# Patient Record
Sex: FEMALE | Race: WHITE | NOT HISPANIC OR LATINO | Employment: FULL TIME | URBAN - METROPOLITAN AREA
[De-identification: names, ages, dates, MRNs, and addresses within clinical notes are randomized per-mention and may not be internally consistent; named-entity substitution may affect disease eponyms.]

---

## 2017-01-16 ENCOUNTER — COMMUNICATION - HEALTHEAST (OUTPATIENT)
Dept: FAMILY MEDICINE | Facility: CLINIC | Age: 27
End: 2017-01-16

## 2017-01-16 DIAGNOSIS — F32.A DEPRESSION: ICD-10-CM

## 2017-03-12 ENCOUNTER — COMMUNICATION - HEALTHEAST (OUTPATIENT)
Dept: FAMILY MEDICINE | Facility: CLINIC | Age: 27
End: 2017-03-12

## 2017-03-12 DIAGNOSIS — H10.13 ALLERGIC CONJUNCTIVITIS, BILATERAL: ICD-10-CM

## 2017-03-12 DIAGNOSIS — F32.A DEPRESSION: ICD-10-CM

## 2017-04-17 ENCOUNTER — OFFICE VISIT - HEALTHEAST (OUTPATIENT)
Dept: FAMILY MEDICINE | Facility: CLINIC | Age: 27
End: 2017-04-17

## 2017-04-17 DIAGNOSIS — F32.A DEPRESSION: ICD-10-CM

## 2017-04-17 DIAGNOSIS — A60.00 GENITAL HERPES: ICD-10-CM

## 2017-04-17 DIAGNOSIS — J06.9 UPPER RESPIRATORY INFECTION: ICD-10-CM

## 2017-04-17 DIAGNOSIS — B00.9 HERPES INFECTION: ICD-10-CM

## 2017-04-17 DIAGNOSIS — H10.13 ALLERGIC CONJUNCTIVITIS, BILATERAL: ICD-10-CM

## 2017-04-17 ASSESSMENT — MIFFLIN-ST. JEOR: SCORE: 1466.02

## 2017-05-02 ENCOUNTER — OFFICE VISIT - HEALTHEAST (OUTPATIENT)
Dept: FAMILY MEDICINE | Facility: CLINIC | Age: 27
End: 2017-05-02

## 2017-05-02 DIAGNOSIS — F41.9 ANXIETY: ICD-10-CM

## 2017-05-02 DIAGNOSIS — F32.A DEPRESSION, UNSPECIFIED DEPRESSION TYPE: ICD-10-CM

## 2017-05-02 DIAGNOSIS — Z30.41 ENCOUNTER FOR SURVEILLANCE OF CONTRACEPTIVE PILLS: ICD-10-CM

## 2017-05-02 DIAGNOSIS — Z00.00 HEALTH CARE MAINTENANCE: ICD-10-CM

## 2017-05-02 DIAGNOSIS — B00.9 HERPES INFECTION: ICD-10-CM

## 2017-05-02 ASSESSMENT — MIFFLIN-ST. JEOR: SCORE: 1456.38

## 2017-05-03 ENCOUNTER — COMMUNICATION - HEALTHEAST (OUTPATIENT)
Dept: FAMILY MEDICINE | Facility: CLINIC | Age: 27
End: 2017-05-03

## 2017-05-04 ENCOUNTER — COMMUNICATION - HEALTHEAST (OUTPATIENT)
Dept: FAMILY MEDICINE | Facility: CLINIC | Age: 27
End: 2017-05-04

## 2017-05-04 ENCOUNTER — AMBULATORY - HEALTHEAST (OUTPATIENT)
Dept: FAMILY MEDICINE | Facility: CLINIC | Age: 27
End: 2017-05-04

## 2017-05-05 ENCOUNTER — COMMUNICATION - HEALTHEAST (OUTPATIENT)
Dept: FAMILY MEDICINE | Facility: CLINIC | Age: 27
End: 2017-05-05

## 2017-05-05 LAB
HSV1 IGG SERPL QL IA: POSITIVE
HSV2 IGG SERPL QL IA: NEGATIVE

## 2017-05-08 LAB
BKR LAB AP ABNORMAL BLEEDING: NO
BKR LAB AP BIRTH CONTROL/HORMONES: NORMAL
BKR LAB AP CERVICAL APPEARANCE: NORMAL
BKR LAB AP GYN ADEQUACY: NORMAL
BKR LAB AP GYN INTERPRETATION: NORMAL
BKR LAB AP GYN OTHER FINDINGS: NORMAL
BKR LAB AP HPV REFLEX: NORMAL
BKR LAB AP LMP: NORMAL
BKR LAB AP PATIENT STATUS: NORMAL
BKR LAB AP PREVIOUS ABNORMAL: NORMAL
BKR LAB AP PREVIOUS NORMAL: NORMAL
HIGH RISK?: NO
HPV INTERPRETATION - HISTORICAL: NORMAL
HPV INTERPRETER - HISTORICAL: NORMAL
PATH REPORT.COMMENTS IMP SPEC: NORMAL
RESULT FLAG (HE HISTORICAL CONVERSION): NORMAL

## 2017-05-09 ENCOUNTER — AMBULATORY - HEALTHEAST (OUTPATIENT)
Dept: FAMILY MEDICINE | Facility: CLINIC | Age: 27
End: 2017-05-09

## 2017-05-09 DIAGNOSIS — B96.89 BV (BACTERIAL VAGINOSIS): ICD-10-CM

## 2017-05-09 DIAGNOSIS — N76.0 BV (BACTERIAL VAGINOSIS): ICD-10-CM

## 2017-05-10 ENCOUNTER — AMBULATORY - HEALTHEAST (OUTPATIENT)
Dept: FAMILY MEDICINE | Facility: CLINIC | Age: 27
End: 2017-05-10

## 2017-05-10 ENCOUNTER — AMBULATORY - HEALTHEAST (OUTPATIENT)
Dept: LAB | Facility: CLINIC | Age: 27
End: 2017-05-10

## 2017-05-10 ENCOUNTER — COMMUNICATION - HEALTHEAST (OUTPATIENT)
Dept: FAMILY MEDICINE | Facility: CLINIC | Age: 27
End: 2017-05-10

## 2017-05-10 DIAGNOSIS — R89.4 POSITIVE TEST FOR HERPES SIMPLEX VIRUS (HSV) ANTIBODY: ICD-10-CM

## 2017-05-12 LAB
HSV1 IGG SERPL QL IA: POSITIVE
HSV2 IGG SERPL QL IA: ABNORMAL

## 2017-06-16 ENCOUNTER — COMMUNICATION - HEALTHEAST (OUTPATIENT)
Dept: FAMILY MEDICINE | Facility: CLINIC | Age: 27
End: 2017-06-16

## 2017-06-16 DIAGNOSIS — R89.4 POSITIVE TEST FOR HERPES SIMPLEX VIRUS (HSV) ANTIBODY: ICD-10-CM

## 2017-06-28 ENCOUNTER — AMBULATORY - HEALTHEAST (OUTPATIENT)
Dept: LAB | Facility: CLINIC | Age: 27
End: 2017-06-28

## 2017-06-28 DIAGNOSIS — R89.4 POSITIVE TEST FOR HERPES SIMPLEX VIRUS (HSV) ANTIBODY: ICD-10-CM

## 2017-06-30 LAB
HSV1 IGG SERPL QL IA: POSITIVE
HSV2 IGG SERPL QL IA: ABNORMAL

## 2017-07-02 LAB
MISCELLANEOUS TEST DEPT. - HE HISTORICAL: NORMAL
PERFORMING LAB: NORMAL
SPECIMEN STATUS: NORMAL
TEST NAME: NORMAL

## 2017-10-22 ENCOUNTER — COMMUNICATION - HEALTHEAST (OUTPATIENT)
Dept: FAMILY MEDICINE | Facility: CLINIC | Age: 27
End: 2017-10-22

## 2017-10-22 DIAGNOSIS — F32.A DEPRESSION: ICD-10-CM

## 2017-12-21 ENCOUNTER — COMMUNICATION - HEALTHEAST (OUTPATIENT)
Dept: FAMILY MEDICINE | Facility: CLINIC | Age: 27
End: 2017-12-21

## 2017-12-21 DIAGNOSIS — Z30.41 ENCOUNTER FOR SURVEILLANCE OF CONTRACEPTIVE PILLS: ICD-10-CM

## 2017-12-29 ENCOUNTER — COMMUNICATION - HEALTHEAST (OUTPATIENT)
Dept: FAMILY MEDICINE | Facility: CLINIC | Age: 27
End: 2017-12-29

## 2018-03-24 ENCOUNTER — COMMUNICATION - HEALTHEAST (OUTPATIENT)
Dept: FAMILY MEDICINE | Facility: CLINIC | Age: 28
End: 2018-03-24

## 2018-03-24 DIAGNOSIS — H10.13 ALLERGIC CONJUNCTIVITIS, BILATERAL: ICD-10-CM

## 2018-03-26 ENCOUNTER — COMMUNICATION - HEALTHEAST (OUTPATIENT)
Dept: FAMILY MEDICINE | Facility: CLINIC | Age: 28
End: 2018-03-26

## 2018-03-26 DIAGNOSIS — F32.A DEPRESSION: ICD-10-CM

## 2018-05-14 ENCOUNTER — COMMUNICATION - HEALTHEAST (OUTPATIENT)
Dept: FAMILY MEDICINE | Facility: CLINIC | Age: 28
End: 2018-05-14

## 2018-05-14 DIAGNOSIS — F32.A DEPRESSION: ICD-10-CM

## 2018-06-12 ENCOUNTER — COMMUNICATION - HEALTHEAST (OUTPATIENT)
Dept: FAMILY MEDICINE | Facility: CLINIC | Age: 28
End: 2018-06-12

## 2018-06-12 DIAGNOSIS — Z30.41 ENCOUNTER FOR SURVEILLANCE OF CONTRACEPTIVE PILLS: ICD-10-CM

## 2021-05-30 VITALS — WEIGHT: 163.25 LBS | BODY MASS INDEX: 27.2 KG/M2 | HEIGHT: 65 IN

## 2021-05-30 VITALS — WEIGHT: 165.38 LBS | HEIGHT: 65 IN | BODY MASS INDEX: 27.56 KG/M2

## 2021-06-02 ENCOUNTER — RECORDS - HEALTHEAST (OUTPATIENT)
Dept: ADMINISTRATIVE | Facility: CLINIC | Age: 31
End: 2021-06-02
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
## 2021-06-15 PROBLEM — B00.9 HERPES INFECTION: Status: ACTIVE | Noted: 2017-04-17

## 2022-10-19 ENCOUNTER — HOSPITAL ENCOUNTER (OUTPATIENT)
Facility: CLINIC | Age: 32
Setting detail: OBSERVATION
Discharge: HOME | End: 2022-10-21
Attending: EMERGENCY MEDICINE | Admitting: EMERGENCY MEDICINE
Payer: COMMERCIAL

## 2022-10-19 DIAGNOSIS — F33.2 SEVERE EPISODE OF RECURRENT MAJOR DEPRESSIVE DISORDER, WITHOUT PSYCHOTIC FEATURES (H): ICD-10-CM

## 2022-10-19 DIAGNOSIS — F43.10 PTSD (POST-TRAUMATIC STRESS DISORDER): ICD-10-CM

## 2022-10-19 DIAGNOSIS — R45.851 SUICIDAL IDEATION: ICD-10-CM

## 2022-10-19 LAB — SARS-COV-2 RNA RESP QL NAA+PROBE: NEGATIVE

## 2022-10-19 PROCEDURE — 99285 EMERGENCY DEPT VISIT HI MDM: CPT | Mod: 25

## 2022-10-19 PROCEDURE — G0378 HOSPITAL OBSERVATION PER HR: HCPCS

## 2022-10-19 PROCEDURE — 90791 PSYCH DIAGNOSTIC EVALUATION: CPT

## 2022-10-19 PROCEDURE — U0005 INFEC AGEN DETEC AMPLI PROBE: HCPCS | Performed by: EMERGENCY MEDICINE

## 2022-10-19 PROCEDURE — 99220 PR INITIAL OBSERVATION CARE,LEVEL III: CPT | Performed by: PSYCHIATRY & NEUROLOGY

## 2022-10-19 PROCEDURE — C9803 HOPD COVID-19 SPEC COLLECT: HCPCS

## 2022-10-19 PROCEDURE — 250N000013 HC RX MED GY IP 250 OP 250 PS 637: Performed by: PSYCHIATRY & NEUROLOGY

## 2022-10-19 RX ORDER — IBUPROFEN 600 MG/1
600 TABLET, FILM COATED ORAL EVERY 6 HOURS PRN
Status: DISCONTINUED | OUTPATIENT
Start: 2022-10-19 | End: 2022-10-21 | Stop reason: HOSPADM

## 2022-10-19 RX ORDER — ARIPIPRAZOLE 2 MG/1
2 TABLET ORAL DAILY
Status: DISCONTINUED | OUTPATIENT
Start: 2022-10-19 | End: 2022-10-21 | Stop reason: HOSPADM

## 2022-10-19 RX ORDER — LANOLIN ALCOHOL/MO/W.PET/CERES
3 CREAM (GRAM) TOPICAL
Status: DISCONTINUED | OUTPATIENT
Start: 2022-10-19 | End: 2022-10-21 | Stop reason: HOSPADM

## 2022-10-19 RX ORDER — HYDROXYZINE HYDROCHLORIDE 50 MG/1
50 TABLET, FILM COATED ORAL 3 TIMES DAILY PRN
Status: DISCONTINUED | OUTPATIENT
Start: 2022-10-19 | End: 2022-10-21 | Stop reason: HOSPADM

## 2022-10-19 RX ORDER — QUETIAPINE FUMARATE 25 MG/1
25 TABLET, FILM COATED ORAL AT BEDTIME
Status: DISCONTINUED | OUTPATIENT
Start: 2022-10-19 | End: 2022-10-21 | Stop reason: HOSPADM

## 2022-10-19 RX ORDER — SERTRALINE HYDROCHLORIDE 25 MG/1
75 TABLET, FILM COATED ORAL DAILY
COMMUNITY
End: 2022-10-19

## 2022-10-19 RX ORDER — QUETIAPINE FUMARATE 25 MG/1
12.5 TABLET, FILM COATED ORAL
COMMUNITY

## 2022-10-19 RX ADMIN — ARIPIPRAZOLE 2 MG: 2 TABLET ORAL at 16:38

## 2022-10-19 RX ADMIN — HYDROXYZINE HYDROCHLORIDE 50 MG: 50 TABLET, FILM COATED ORAL at 19:27

## 2022-10-19 RX ADMIN — QUETIAPINE FUMARATE 25 MG: 25 TABLET ORAL at 22:32

## 2022-10-19 RX ADMIN — IBUPROFEN 600 MG: 600 TABLET ORAL at 16:38

## 2022-10-19 ASSESSMENT — ENCOUNTER SYMPTOMS
FEVER: 0
COUGH: 0
APPETITE CHANGE: 1

## 2022-10-19 ASSESSMENT — ACTIVITIES OF DAILY LIVING (ADL)
ADLS_ACUITY_SCORE: 33
ADLS_ACUITY_SCORE: 35

## 2022-10-19 NOTE — ED NOTES
"32 year old female with history of PTSD, depression, and anxiety received from ED due to increased depression and SI. Reports increased sadness as well as weight loss recently. Reports current SI with multiple plans. Pt reports a suicide attempt in 2008 and the most recent attempt about 1 1/2 years ago. Pt reports taking \"sertraline\" but records indicate that this may actually be \"fluoxetine\". Nursing and risk assessments completed. Assessments reviewed with [facility] and physician. Admission information reviewed with patient. Patient given a tour of [facility] and instructions on using the facility. Questions regarding [facility] addressed. Pt safety search completed.    "

## 2022-10-19 NOTE — ED TRIAGE NOTES
"Patient states \"something is wrong with my brain, I dont want to live anymore\". Plan is to start car in garage or poisonious or jumping\". Patient quit job this morning. States has been crying for 3 months.       "

## 2022-10-19 NOTE — PLAN OF CARE
Josephine Brown  October 19, 2022  Plan of Care Hand-off Note     Patient Care Path: Observation    Plan for Care:   Recommendation for patient is to remain in [facility] for safety, monitoring and stabilization. She presents to the ED with significant depression and suicidal ideation with plan, means and intent. Patient has several stressors, abruptly quit her job today and has begun making preparations for her family following her death (including cancelling memberships/bills, writing a letter). Patient does acknowledge that she wants help for her symptoms and this is why she voluntarily presented to the hospital; writer discusses plan for her to remain on [facility] at this time for medication management with psychiatry and ongoing therapeutic check-ins with [facility].     Patient will need to be closely monitored and reassessed daily for symptoms, safety and disposition planning. If symptoms persist with minimal relief [facility], inpatient admission should be strongly considered. However, if patient is able to engage in a well established safety plan (with likely family involvement and limiting access to lethal means) as well as increased outpatient support, discharge could be considered.     Patient is agreeable at this time to remaining in observation.     Critical Safety Issues: suicidal ideation with plan (several including crashing her car, jumping off a bridge/building, carbon monoxide poisoning or overdosing), means and intent. She has been engaging in preparatory behaviors. Further, she has access to firearms in her home.     Overview:  This patient is a child/adolescent: No    This patient has additional special visitor precautions: No    Legal Status: Voluntary, but holdable due to suicidal ideation with plan, means and intent, as well as need for further observation and stabilization     Contacts:   Ronda, mother: Contact [phone]  Giovani, father: Contact [phone]    Psychiatry  Consult:  Patient has been seen by Dr. Alarcon who agrees with plan for observation.       Updated RN and Attending Provider regarding plan of care.    Tuyet Spring
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
## 2022-10-19 NOTE — ED NOTES
Pt presents as tearful, flat, and cooperative. Pt met with LMHP and will meet with provider to determine plan.

## 2022-10-19 NOTE — ED PROVIDER NOTES
History   Chief Complaint:  Suicidal       HPI   Josephine Brown is a 32 year old female with history of depression, anxiety, PTSD and suicidal ideation who presents with suicidal thoughts. She reports that her mental health has been worsening lately and has been crying more easily in the past 3 months. She has also lost 15 lbs in the past 3 months due to poor appetite. 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 She has had increasing suicidal thoughts in the past couple days. She plans to jump off a bridge, start her car in the garage or find something poisonous. She has history of suicide attempt in the past when she stole arsenic from her college lab. She was also hospitalized once before in 2007 after this. She lives alone. She has supportive family in the area as well as friends. She takes sleeping pill and sertraline which she is compliant with. She has had an increase in her dosage in the spring. She reports of losing her service dog of 10 years this spring. She sees a therapist biweekly virtually which is helpful for her. Denies cough or fever.    Review of Systems   Constitutional: Positive for appetite change. Negative for fever.   Respiratory: Negative for cough.    Psychiatric/Behavioral: Positive for suicidal ideas.   All other systems reviewed and are negative.      Allergies:  Beer  Doxycycline    Medications:  Sertraline  Seroquel    Past Medical History:     Anxiety  PTSD  Depression  Intestinal parasitism  Herpes infection  Suicidal ideation    Past Surgical History:    Appendectomy  Bunionectomy  Cholecystectomy  Tonsillectomy  Tympanostomy tube placement, bilateral  Farber teeth extraction    Family History:    Mother - type I diabetes, hypothyroidism  Brother - asthma    Social History:  The patient presents to the ED alone.  She lives alone.  PCP: Pippa Santiago       Physical Exam     Patient Vitals for the past 24 hrs:   BP Temp Temp src Pulse  "Resp SpO2 Height Weight   10/19/22 0945 136/76 96.8  F (36  C) Temporal 69 16 97 % 1.651 m (5' 5\") 73 kg (161 lb)     Physical Exam  SKIN:  Warm, dry.  HEMATOLOGIC/IMMUNOLOGIC/LYMPHATIC:  No pallor.  EYES:  Conjunctivae normal.  CARDIOVASCULAR:  Regular rate and rhythm.  RESPIRATORY:  No respiratory distress, breath sounds equal and normal.  GASTROINTESTINAL:  Soft, nontender abdomen.  MUSCULOSKELETAL: Normal body habitus.  NEUROLOGIC:  Alert, conversant.  PSYCHIATRIC: Depressed mood with flat affect.  Calm and cooperative.  Does not appear to be responding to internal stimuli.  No psychotic features.    Emergency Department Course     Laboratory:  Labs Ordered and Resulted from Time of ED Arrival to Time of ED Departure   COVID-19 VIRUS (CORONAVIRUS) BY PCR - Normal       Result Value    SARS CoV2 PCR Negative          Emergency Department Course:  Mental Health Risk Assessment      PSS-3    Date and Time Over the past 2 weeks have you felt down, depressed, or hopeless? Over the past 2 weeks have you had thoughts of killing yourself? Have you ever attempted to kill yourself? When did this last happen? User   10/19/22 0946 yes yes yes more than 6 months ago KML      C-SSRS ([place])    Date and Time Q1 Wished to be Dead (Past Month) Q2 Suicidal Thoughts (Past Month) Q3 Suicidal Thought Method Q4 Suicidal Intent without Specific Plan Q5 Suicide Intent with Specific Plan Q6 Suicide Behavior (Lifetime) Within the Past 3 Months? RETIRED: Level of Risk per Screen Screening Not Complete User   10/19/22 0946 yes yes yes yes yes yes -- -- -- KML              Suicide assessment completed by mental health (D.EPadmajaC., LCSW, etc.)    Reviewed:  I reviewed nursing notes, vitals, past medical history and Care Everywhere    Assessments:  1002 I obtained history and examined the patient as noted above.     Disposition:  The patient was transferred to [facility].     Impression & Plan     Medical Decision Making:  This patient presents " with declining mental health and admits to feeling suicidal with several considered suicidal plans.  [facility]  COVID test proved negative so she was transferred to [facility] for further care.  I did not think she required other testing as far as medical clearance.    Diagnosis:    ICD-10-CM    1. Suicidal ideation  R45.851           Discharge Medications:  New Prescriptions    No medications on file       Scribe Disclosure:  I, Melissa Mota, am serving as a scribe at 9:51 AM on 10/19/2022 to document services personally performed by Kel Echols MD based on my observations and the provider's statements to me.              Kel Echols MD  10/19/22 3541

## 2022-10-19 NOTE — ED PROVIDER NOTES
EmPATH Unit - Initial Psychiatric Observation Note  [facility] Emergency Department  Observation Initiation Date: Oct 19, 2022    Josephine Brown MRN: [id number]   Age: 32 year old YOB: 1990     History     Chief Complaint   Patient presents with     Suicidal     HPI  Josephine Brown is a 32 year old female with a past history notable for major depressive disorder and PTSD who presented to the emergency department voluntarily reporting worsening depressed mood and suicidal ideation.  She had also reported resigning from her job shortly prior to coming to the emergency department.  She had been contemplating various means of pursuing suicide while experiencing severe depressive symptoms and a sense of hopelessness.  In the emergency department, she was determined to be medically stable and transferred to the EmPATH unit for psychiatric assessment.  On examination, the patient recalls maintaining adequate control of her depressive symptoms over the past many years through utilizing fluoxetine at varying doses in conjunction with psychotherapy.  She alluded to a history of trauma leading to a diagnosis of PTSD, further complicated by several depressive episodes.  More recently, she was in a meaningful relationship with her significant other, anticipating marriage and a family, until he disclosed to her that he is not attracted to her.  This led to a discord of their relationship and activation of negative thoughts that triggered low self-esteem and low self-worth.  Vegetative symptoms progressively worsened, eventually leading to loss of motivation and inability to function.  This intensified a sense of hopelessness, leading to suicidal ideation.  There was no indication of psychosis or homicidal thoughts.  She presents today as help seeking and wanting to gain treatment interventions to address these symptoms.  She also reports occasional cannabis use.  No other illicit substance usage reported.  She has  "maintained adherence with her medications and has been attending psychotherapy as scheduled.    Past Medical History  Past Medical History:   Diagnosis Date     Mental disorder 2007    PTSD     Past Surgical History:   Procedure Laterality Date     APPENDECTOMY  2009     BUNIONECTOMY Bilateral 2008    7 follow-up surgeries on the right side     CHOLECYSTECTOMY  2014     QUEtiapine (SEROQUEL) 25 MG tablet  Prenatal Vit-Fe Fumarate-FA (PRENATAL MULTIVITAMIN  PLUS IRON) 27-0.8 MG TABS      Allergies   Allergen Reactions     Beer Anaphylaxis     Doxycycline Hives     Family History  Family History   Problem Relation Age of Onset     Diabetes Mother         Type 1     Hypothyroidism Mother      Asthma Brother      Breast Cancer Maternal Aunt 65.00        Breast     Depression Maternal Aunt      Cancer Maternal Uncle         Brain     Diabetes Maternal Grandmother         Type 2     Dementia Paternal Grandfather      Social History   Social History     Tobacco Use     Smoking status: Some Days     Types: Cigarettes     Last attempt to quit: 1/1/2007     Years since quitting: 15.8   Substance Use Topics     Alcohol use: No     Drug use: No      Past medical history, past surgical history, medications, allergies, family history, and social history were reviewed with the patient. No additional pertinent items.       Review of Systems  A complete review of systems was performed with pertinent positives and negatives noted in the HPI, and all other systems negative.    Physical Examination   BP: 136/76  Pulse: 69  Temp: 96.8  F (36  C)  Resp: 16  Height: 165.1 cm (5' 5\")  Weight: 73 kg (161 lb)  SpO2: 97 %    Physical Exam  General: Appears stated age.   Neuro: Alert and fully oriented. Extremities appear to demonstrate normal strength on visual inspection.   Integumentary/Skin: no rash visualized, normal color    Psychiatric Examination   Appearance: awake, alert  Attitude:  cooperative  Eye Contact:  fair  Mood:  anxious and " depressed  Affect:  mood congruent and intensity is blunted  Speech:  clear, coherent  Psychomotor Behavior:  no evidence of tardive dyskinesia, dystonia, or tics  Thought Process:  linear  Associations:  no loose associations  Thought Content:  no evidence of psychotic thought and passive suicidal ideation present  Insight:  fair  Judgement:  fair  Oriented to:  time, person, and place  Attention Span and Concentration:  fair  Recent and Remote Memory:  fair  Language: able to name/identify objects without impairment  Fund of Knowledge: intact with awareness of current and past events    ED Course        Labs Ordered and Resulted from Time of ED Arrival to Time of ED Departure   COVID-19 VIRUS (CORONAVIRUS) BY PCR - Normal       Result Value    SARS CoV2 PCR Negative     TSH WITH FREE T4 REFLEX       Assessments & Plan (with Medical Decision Making)   Patient presenting with relapse of depressive symptoms in the context of several psychosocial stressors and a relationship discord.  Preceding this episode, she has maintained a favorable response to fluoxetine at varying doses depending on the prevalence of her depressive symptoms.  She is also requesting thyroid testing while noting a history of thyroid dysfunction in the family and concern for vegetative symptoms.  Nursing notes reviewed noting no acute issues.     I have reviewed the assessment completed by the [facility].     During the observation period, the patient did not require medications for agitation, and did not require restraints/seclusion for patient and/or provider safety.     The patient was found to have a psychiatric condition that would benefit from an observation stay in the emergency department for further psychiatric stabilization and/or coordination of a safe disposition. The observation plan includes serial assessments of psychiatric condition, potential administration of medications if indicated, further disposition pending the patient's  psychiatric course during the monitoring period.     Preliminary diagnosis:    ICD-10-CM    1. Suicidal ideation  R45.851       2. Severe episode of recurrent major depressive disorder, without psychotic features (H)  F33.2       3. PTSD (post-traumatic stress disorder)  F43.10           Treatment Plan:  -Increase fluoxetine from 60 mg to 80 mg daily to optimize antidepressant treatments while noting that she has gained benefit from this medication in the past.  -Add Abilify 2 mg daily for augmentation of fluoxetine, targeting symptoms of her depressive disorder.  Risks and benefits were reviewed.  -Labs have been ordered including TSH  -Resume individual psychotherapy  -Resume outpatient medication management appointments  -Consider a referral for intensive outpatient programming  -Enter to observation status and reassess tomorrow.    --  Tera Alarcon MD   [facility] EMERGENCY DEPT  EmPATH Unit  10/19/2022        Tera Alarcon MD  10/19/22 [id number]

## 2022-10-20 LAB — TSH SERPL DL<=0.005 MIU/L-ACNC: 0.77 MU/L (ref 0.4–4)

## 2022-10-20 PROCEDURE — 36415 COLL VENOUS BLD VENIPUNCTURE: CPT | Performed by: PSYCHIATRY & NEUROLOGY

## 2022-10-20 PROCEDURE — 84443 ASSAY THYROID STIM HORMONE: CPT | Performed by: PSYCHIATRY & NEUROLOGY

## 2022-10-20 PROCEDURE — 250N000013 HC RX MED GY IP 250 OP 250 PS 637: Performed by: PSYCHIATRY & NEUROLOGY

## 2022-10-20 PROCEDURE — 99214 OFFICE O/P EST MOD 30 MIN: CPT | Performed by: NURSE PRACTITIONER

## 2022-10-20 PROCEDURE — G0378 HOSPITAL OBSERVATION PER HR: HCPCS

## 2022-10-20 RX ORDER — FLUOXETINE 40 MG/1
80 CAPSULE ORAL DAILY
Qty: 60 CAPSULE | Refills: 0 | Status: SHIPPED | OUTPATIENT
Start: 2022-10-20

## 2022-10-20 RX ORDER — ARIPIPRAZOLE 2 MG/1
2 TABLET ORAL DAILY
Qty: 30 TABLET | Refills: 0 | Status: SHIPPED | OUTPATIENT
Start: 2022-10-20

## 2022-10-20 RX ADMIN — ARIPIPRAZOLE 2 MG: 2 TABLET ORAL at 09:01

## 2022-10-20 RX ADMIN — MELATONIN TAB 3 MG 3 MG: 3 TAB at 21:26

## 2022-10-20 RX ADMIN — HYDROXYZINE HYDROCHLORIDE 50 MG: 50 TABLET, FILM COATED ORAL at 22:26

## 2022-10-20 RX ADMIN — QUETIAPINE FUMARATE 25 MG: 25 TABLET ORAL at 21:26

## 2022-10-20 RX ADMIN — FLUOXETINE 80 MG: 20 CAPSULE ORAL at 09:01

## 2022-10-20 ASSESSMENT — ACTIVITIES OF DAILY LIVING (ADL)
ADLS_ACUITY_SCORE: 35

## 2022-10-20 NOTE — ED NOTES
"Pt came to nurses desk and requested sleeping medication. When asked about wanting to go to bed, patient stated \" I don't really want to go to bed but I don't want to be awake\". Pt was offered hydroxyzine to help with anxiety and was provided education on how medication worked and side effects.   "

## 2022-10-20 NOTE — ED NOTES
"[facility] Crisis Reassessment      Pt was first seen on 10/19/2022 by Tuyet Spring; see the initial assessment note for details.      Patient Presentation    Initial ED presentation details: She presents to the ED with significant depression and suicidal ideation with plan, means and intent. Patient has several stressors, abruptly quit her job today and has begun making preparations for her family following her death (including cancelling memberships/bills, writing a letter). Patient does acknowledge that she wants help for her symptoms and this is why she voluntarily presented to the hospital; writer discusses plan for her to remain on emPATH at this time for medication management with psychiatry and ongoing therapeutic check-ins with [facility].     Current patient presentation: Patient was sitting in a recliner coloring on paper when writer approached her for a check in. She presented with a blunted affect but brightens appropriately. She states she feels anxious about not knowing what her plan is. She states she is still feeling suicidal, but less intensely compared to yesterday. She states she has been adjusting to med changes well. She states she has a virtual appointment with her therapist tomorrow at 1pm, that she would like to attend.     Changes observed since initial assessment: Patient states that she feels better than when she arrived to [facility]. She states that if she cries, she is able to stop. She compares this to uncontrollable crying for the past month. On a scale of 0-10, she states that her SI severity is at a \"3 or 4.\" Yesterday, it was at a 9. She states that she is hoping not to go inpatient. She states that she is not interested in PHP/IOP at this time because she feels she needs to work.       Risk of Harm    Repeat ESS-6: Date of Completion 10/20/2022  1.a. Over the past 2 weeks, have you had thoughts of killing yourself? Yes  1.b. Have you ever attempted to kill yourself and, if yes, when did this " last happen? Yes 1.5 years ago   2. Recent or current suicide plan? Yes recent, denies current plan   3. Recent or current intent to act on ideation? Yes recent, denies intent currently  4. Lifetime psychiatric hospitalization? Yes  5. Pattern of excessive substance use? No  6. Current irritability, agitation, or aggression? No  Scoring note: BOTH 1a and 1b must be yes for it to score 1 point, if both are not yes it is zero. All others are 1 point per number. If all questions 1a/1b - 6 are no, risk is negligible. If one of 1a/1b is yes, then risk is mild. If either question 2 or 3, but not both, is yes, then risk is automatically moderate regardless of total score. If both 2 and 3 are yes, risk is automatically high regardless of total score.      Score: 4, moderate risk    Is the patient experiencing current suicidal ideation: Yes. Thoughts to kill self with no plan or intent    Does the patient have thoughts of harming others? No      Does the patient have access to lethal means? Yes - describe: Pt states she has a rifle and shot gun at home that is locked up.      Does the patient engage in non-suicidal self-injurious behavior (NSSI/SIB)? no     Does the patient have thoughts of harming others? No    Mental Status Exam   Affect: Blunted   Appearance: Appropriate    Attention Span/Concentration: Attentive?    Eye Contact: Engaged   Fund of Knowledge: Appropriate    Language /Speech Content: Fluent   Language /Speech Volume: Normal    Language /Speech Rate/Productions: Normal    Recent Memory: Intact   Remote Memory: Intact   Mood: Anxious and Depressed    Orientation to Person: Yes    Orientation to Place: Yes   Orientation to Time of Day: Yes    Orientation to Date: Yes    Situation (Do they understand why they are here?): Yes    Psychomotor Behavior: Normal    Thought Content: Suicidal   Thought Form: Intact         Therapeutic Intervention  The following therapeutic methodologies were employed when working with  the patient: Establishing rapport, Active listening, Assess dimensions of crisis, Apply solution-focused therapy to address current crisis, Identify additional supports and alternative coping skills, Brief Supportive Therapy and Trauma-Informed Care. Patient response to intervention: receptive.      Clinical Substantiation of Recommendations  Patient reports significant improvement of depressive sx, including reduction of SI intensity. Pt still endorses SI, although she denies plan/intent at this time. Pt is in agreement to stay at [facility] for observation for another night. Upon discharge, she would like to discuss more therapy options. She currently has a therapist she sees biweekly. Her next appointment is tomorrow 10/21/22 virtually at 1pm. She would like to keep this appointment.     Plan:    Disposition  Recommended disposition: Observation in EmPATH      Reviewed case and recommendations with attending provider. Attending Name: Writer has not yet consulted with attending provider at this time.       Attending concurs with disposition: N/A    Patient concurs with disposition: Yes      Final disposition: Observation in EmPATH        Assessment Details  Total duration spent on the patient case in minutes: .75 hrs     CPT code(s) utilized: 05973 - Psychotherapy (with patient) - 45 (38-52*) min       FAUSTINA Brooks, [facility]  Callback: [phone]

## 2022-10-20 NOTE — PROGRESS NOTES
Patient slept soundly through the night without waking. Respirations even and unlabored. Changing positions occasionally. Plan for reassessment today.

## 2022-10-20 NOTE — ED NOTES
Patient has been visible on the unit and social with peers. Spent time this evening playing card games with peers and participated appropriately. Patient's mood remains depressed, anxious with sad affect. She was tearful during interactions with staff. Patient continues to endorse suicidal ideation. She expressed frustration with her situation. Compliant with medication. Currently in sensory room D. Nursing will continue to to monitor for safety.

## 2022-10-20 NOTE — PROGRESS NOTES
Pt has been calm, pleasant and cooperative this shift.Pt was med compliant. Pt was visible on the unit this shift. She socialized with staff and peers. She took a shower today. Plan for pt to stay on observation status and be reassessed in the morning. Her discharge meds were placed in her locker.

## 2022-10-20 NOTE — ED PROVIDER NOTES
"[facility] Unit - Psychiatric Consultation  [facility] Emergency Department    Josephine Brown MRN: [id number]   Age: 32 year old YOB: 1990     History     Chief Complaint   Patient presents with     Suicidal     HPI  Josephine Brown is a 32 year old female with a past history notable for major depressive disorder and PTSD who presented to the emergency department voluntarily reporting worsening depressed mood and suicidal ideation.  She had also reported resigning from her job shortly prior to coming to the emergency department.  She had been contemplating various means of pursuing suicide while experiencing severe depressive symptoms and a sense of hopelessness.  In the emergency department, she was determined to be medically stable and transferred to the EmPATH unit for psychiatric assessment.    Patient is seen for reassessment today. She received the increased dose of fluoxetine, as well as the initial dose of aripiprazole. She is thus far tolerating these doses, noting some mild GI discomfort but no nausea/vomiting. She reports having slept better than expected last evening, feels adequately rested today. She reports chronic night sweats, which she had also experienced last night. Her appetite is a bit low, and she states has been low for the past 6-9 months. She is hoping to be able to continue to exercise here at [facility]. She states her mood has improved and attributes this to the change in environment. She notes that today, she no longer feels that she \"needs\" to die, despite not wanting to live. Yesterday, she felt that she wanted and needed to die. She would like to remain in a supportive environment for another night, noting benefit from being here.     Past Medical History  Past Medical History:   Diagnosis Date     Mental disorder 2007    PTSD     Past Surgical History:   Procedure Laterality Date     APPENDECTOMY  2009     BUNIONECTOMY Bilateral 2008    7 follow-up surgeries on the right side " "    CHOLECYSTECTOMY  2014     QUEtiapine (SEROQUEL) 25 MG tablet  Prenatal Vit-Fe Fumarate-FA (PRENATAL MULTIVITAMIN  PLUS IRON) 27-0.8 MG TABS      Allergies   Allergen Reactions     Beer Anaphylaxis     Doxycycline Hives     Family History  Family History   Problem Relation Age of Onset     Diabetes Mother         Type 1     Hypothyroidism Mother      Asthma Brother      Breast Cancer Maternal Aunt 65.00        Breast     Depression Maternal Aunt      Cancer Maternal Uncle         Brain     Diabetes Maternal Grandmother         Type 2     Dementia Paternal Grandfather      Social History   Social History     Tobacco Use     Smoking status: Some Days     Types: Cigarettes     Last attempt to quit: 1/1/2007     Years since quitting: 15.8   Substance Use Topics     Alcohol use: No     Drug use: No      Past medical history, past surgical history, medications, allergies, family history, and social history were reviewed with the patient. No additional pertinent items.       Review of Systems  A complete review of systems was performed with pertinent positives and negatives noted in the HPI, and all other systems negative.    Physical Examination   BP: 136/76  Pulse: 69  Temp: 96.8  F (36  C)  Resp: 16  Height: 165.1 cm (5' 5\")  Weight: 73 kg (161 lb)  SpO2: 97 %    Physical Exam  General: Appears stated age.   Neuro: Alert and fully oriented. Extremities appear to demonstrate normal strength on visual inspection.   Integumentary/Skin: no rash visualized, normal color    Psychiatric Examination   Appearance: awake, alert, adequately groomed, appeared as age stated and casually dressed  Attitude:  cooperative  Eye Contact:  intense  Mood:  depressed  Affect:  mood congruent and intensity is blunted  Speech:  clear, coherent  Psychomotor Behavior:  no evidence of tardive dyskinesia, dystonia, or tics  Thought Process:  linear  Associations:  no loose associations  Thought Content:  no evidence of psychotic thought and " passive suicidal ideation present  Insight:  fair  Judgement:  fair  Oriented to:  time, person, and place  Attention Span and Concentration:  fair  Recent and Remote Memory:  intact  Language: able to name/identify objects without impairment  Fund of Knowledge: intact with awareness of current and past events    ED Course        Labs Ordered and Resulted from Time of ED Arrival to Time of ED Departure   COVID-19 VIRUS (CORONAVIRUS) BY PCR - Normal       Result Value    SARS CoV2 PCR Negative     TSH WITH FREE T4 REFLEX - Normal    TSH 0.77         Assessments & Plan (with Medical Decision Making)   Patient presenting with worsening depressive symptoms in the setting of psychosocial stress and relationship discord. TSH was drawn and results were reviewed with patient. Thus far, tolerating increased dose of fluoxetine and initiation of Abilify. Nursing notes reviewed noting no acute issues.     I have reviewed the assessment completed by the [facility].     Preliminary diagnosis:    ICD-10-CM    1. Suicidal ideation  R45.851       2. Severe episode of recurrent major depressive disorder, without psychotic features (H)  F33.2       3. PTSD (post-traumatic stress disorder)  F43.10            Treatment Plan:  - Continue on observation status while awaiting further improvements in mood and suicidal thinking.   - Continue fluoxetine at the increased dose of 80 mg daily for treatment of depression and PTSD symptoms  - Continue newly initiated Abilify 2 mg daily for mood augmentation  - Continue quetiapine 25 mg at bedtime for sleep  - Reviewed with patient her TSH result - WNL at 0.77  - Patient has outpatient therapy arranged for tomorrow at 1pm. She will likely discharge prior to that appointment pending tomorrow's reassessment.    --  Vince Benz CNP   [facility] EMERGENCY DEPT  EmPATH Unit  10/19/2022      Vince Benz CNP  10/20/22 [id number]

## 2022-10-21 VITALS
RESPIRATION RATE: 16 BRPM | HEART RATE: 75 BPM | SYSTOLIC BLOOD PRESSURE: 120 MMHG | OXYGEN SATURATION: 97 % | WEIGHT: 161.3 LBS | HEIGHT: 65 IN | BODY MASS INDEX: 26.87 KG/M2 | TEMPERATURE: 98.7 F | DIASTOLIC BLOOD PRESSURE: 81 MMHG

## 2022-10-21 PROCEDURE — G0378 HOSPITAL OBSERVATION PER HR: HCPCS

## 2022-10-21 PROCEDURE — 250N000013 HC RX MED GY IP 250 OP 250 PS 637: Performed by: PSYCHIATRY & NEUROLOGY

## 2022-10-21 PROCEDURE — 99217 PR OBSERVATION CARE DISCHARGE: CPT | Performed by: NURSE PRACTITIONER

## 2022-10-21 RX ADMIN — ARIPIPRAZOLE 2 MG: 2 TABLET ORAL at 08:16

## 2022-10-21 RX ADMIN — FLUOXETINE 80 MG: 20 CAPSULE ORAL at 08:16

## 2022-10-21 ASSESSMENT — ACTIVITIES OF DAILY LIVING (ADL)
ADLS_ACUITY_SCORE: 35

## 2022-10-21 NOTE — ED NOTES
Patient stated this am that suicidal thoughts have come to her but she is able to push them away. She denies that she  is suicidal despite the thoughts.  She has been pleasant and cooperative. [facility].  Reviewed safety plan, follow up care plan, and out patient rescources with the patient. Reviewed medication regime including any changes to existing medications and reviewed new medications.Patient instructed not to stop medications without consent from their physician. Patient sent home with abilify and prozac. Patient verbalizes understanding of these things along with all discharge instructions.  Patient has a copy of the AVS and verbalizes understanding of them. All belongings brought into the hospital were given to the patient at discharge.  Patient escorted off the unit with staff. Patient discharged to home at [id number] via driving her own car.

## 2022-10-21 NOTE — ED PROVIDER NOTES
"[facility] Unit - Psychiatric Observation Discharge Summary  [facility] Emergency Department  Discharge Date: 10/21/2022    Josephine Brown MRN: [id number]   Age: 32 year old YOB: 1990     Brief HPI & Initial ED Course     Chief Complaint   Patient presents with     Suicidal     HPI  Josephine Brown is a 32 year old female with a past history notable for major depressive disorder and PTSD who presented to the emergency department voluntarily reporting worsening depressed mood and suicidal ideation.  She had also reported resigning from her job shortly prior to coming to the emergency department.  She had been contemplating various means of pursuing suicide while experiencing severe depressive symptoms and a sense of hopelessness.  In the emergency department, she was determined to be medically stable [facility]    Patient seen for reassessment today. She endorses feeling better than when she first arrived to [facility], describing feeling \"stable.\" She denies current thoughts, plans, or intent to harm herself. She is noting mild improvements in her mood, but realizes she is not feeling completely well yet. Discussed that these symptoms did not evolve overnight and so will not resolve overnight, will continue to require some hard work on her part, which she acknowledges. She is tolerating the fluoxetine and Abilify well without report of side effects today. She is comfortable returning home, plans to spend the weekend with parents and enjoy the nice weather. She is agreeable to her father removing her firearms from the home.       Physical Examination   BP: 120/81  Pulse: 75  Temp: 98.7  F (37.1  C)  Resp: 16  Height: 165.1 cm (5' 5\")  Weight: 73.2 kg (161 lb 4.8 oz)  SpO2: 97 %    Physical Exam  General: Appears stated age.   Neuro: Alert and fully oriented. Extremities appear to demonstrate normal strength on visual inspection.   Integumentary/Skin: no rash " visualized, normal color    Psychiatric Examination   Appearance: awake, alert, adequately groomed, appeared as age stated and casually dressed  Attitude:  cooperative  Eye Contact:  good  Mood:  better  Affect:  mood congruent, intensity is normal and reactive  Speech:  clear, coherent and normal prosody  Psychomotor Behavior:  no evidence of tardive dyskinesia, dystonia, or tics  Thought Process:  linear and goal oriented  Associations:  no loose associations  Thought Content:  no evidence of suicidal ideation or homicidal ideation and no evidence of psychotic thought  Insight:  fair  Judgement:  intact  Oriented to:  time, person, and place  Attention Span and Concentration:  intact  Recent and Remote Memory:  intact  Language: able to name/identify objects without impairment  Fund of Knowledge: intact with awareness of current and past events    Results        Labs Ordered and Resulted from Time of ED Arrival to Time of ED Departure   COVID-19 VIRUS (CORONAVIRUS) BY PCR - Normal       Result Value    SARS CoV2 PCR Negative     TSH WITH FREE T4 REFLEX - Normal    TSH 0.77         Observation Course   The patient was found to have a psychiatric condition that would benefit from an observation stay in the emergency department for further psychiatric stabilization and/or coordination of a safe disposition. The plan upon observation admission included serial assessments of psychiatric condition, potential administration of medications if indicated, further disposition pending the patient's psychiatric course during the monitoring period.     Serial assessments of the patient's psychiatric condition were performed. Nursing notes were reviewed. During the observation period, the patient did not require medications for agitation, and did not require restraints/seclusion for patient and/or provider safety.     After a period of working with the treatment team on the [facility] unit, the patient's mental state improved to  allow a safe transition to outpatient care. After counseling on the diagnosis, work-up, and treatment plan, the patient was discharged. Close follow-up with a psychiatrist and/or therapist was recommended and community psychiatric resources were provided. Patient is to return to the ED if any urgent or potentially life-threatening concerns.     Discharge Diagnoses:   Final diagnoses:   Suicidal ideation   Severe episode of recurrent major depressive disorder, without psychotic features (H)   PTSD (post-traumatic stress disorder)       Treatment Plan:  - Continue increased dose of fluoxetine 80 mg daily for treatment of depression and PTSD  - Continue Abilify 2 mg daily for mood augmentation  - Continue quetiapine 25 mg at bedtime for sleep. Recommend to work with outpatient provider to transition to a different sleep agent so that she is not taking two scheduled antipsychotics.   - Patient will follow-up with established outpatient psychiatrist and individual psychotherapist  - Patient to be discharged home today, will be staying with family. She denies any active thoughts, plans or intent to harm self. Firearms (which she uses for deer hunting) will be removed by her father.       At the time of discharge, the patient's acute suicide risk was determined to be low due to the following factors: Reduction in the intensity of mood/anxiety symptoms that preceded the admission, denial of suicidal thoughts, denies feeling helpless or helpless, not currently under the influence of alcohol or illicit substances, denies experiencing command hallucinations, no immediate access to firearms. The patient's acute risk could be higher if noncompliant with their treatment plan, medications, follow-up appointments or using illicit substances or alcohol. Protective factors include: social supports, stable housing, Taoism beliefs  --  Vince Benz CNP  [facility] EMERGENCY DEPT  EmPATH Unit  10/21/2022       Vince Benz, CNP  10/21/22 1021

## 2022-10-21 NOTE — DISCHARGE INSTRUCTIONS
Aftercare Plan      Follow up with established providers and supports as scheduled. Continue taking medications as prescribed. Abstain from drugs and alcohol. Utilize your [facility] mental health crisis team as needed. They are available 24/7. Contact information is listed below.       If I am feeling unsafe or I am in a crisis, I will:   Contact my established care providers   Call the [place] Suicide Prevention Lifeline: [phone]   Go to the nearest emergency room   Call 911     Warning signs that I or other people might notice when a crisis is developing for me: changes to sleep, appetite or mood, increased anger, agitation or irritability, feeling depressed or hopeless, spending more time alone or talking less, increased crying, decreased productivity, seeing or hearing things that aren't there, thoughts of not wanting to live anymore or of actually killing myself, thoughts of hurting others    Things I am able to do on my own to cope or help me feel better: watching a favorite tv show or movie, listening to music I enjoy, going outside and breathing fresh air, going for a walk or exercising, taking a shower or bath, a cold or hot beverage, a healthy snack, drawing/coloring/painting, journaling, singing or dancing, deep breathing     I can try practicing square breathing when I begin to feel anxious - inhale through the nose for the count of 4 and the first line on the square. Exhale through the mouth for the count of 4 for the second line of the square. Repeat to complete the square. Repeat the square as many times as needed.    I can also use my five senses to practice mindfulness and grounding. What are five things I can see, four things I can hear, three things I can feel, two things I can smell, and one thing I can taste.     Things that I am able to do with others to cope or help me feel better: sometimes just talking or spending time with someone else, sharing a meal or having coffee, watching a movie  "or playing a game, going for a walk or exercising    I can also use community resources including mental health hotlines, [facility] crisis teams, or apps.     Things I can use or do for distraction: take a hot bath, exercise, movies/tv, music, reading, games, drawing/coloring/painting or other art, essential oils, cleaning/organizing, puzzles, crossword puzzles, word search, Sudoku       I can also download a meditation or relaxation denisha, like Calm, Headspace, or Insight Timer (all three offer a free version)    Changes I can make to support my mental health and wellness: Attend scheduled mental health therapy and psychiatric appointments. Take my medications as prescribed. Maintain a daily schedule/routine. Abstain from all mood altering substances, including drugs, alcohol, or medications not currently prescribed to me. Implement a self-care routine.      People in my life that I can ask for help: friends or family, trusted teachers/staff/colleagues, trusted members of my community or place of [employer], mental health crisis lines, or 911    Your [facility] has a mental health crisis team you can call 24/7: [facility], [phone]    Other things that are important when I m in crisis: to remember that the feelings I am having right now are temporary, and it won't feel like this forever, and that it is okay and important to ask for help    Crisis Lines  Crisis Text Line  Text 547599  You will be connected with a trained live crisis counselor to provide support.    Por espanol, texto  GEORGI a 718687 o texto a 442-AYUDAME en [facility] Hope Line  1.800.SUICIDE [0685970]      Community Resources  Fast Tracker  Linking people to mental health and substance use disorder resources  fasttrackermn.org     [place] Mental Health Warm Line  Peer to peer support  Monday thru Saturday, 12 pm to 10 pm  743.991.6016 or 5.164.786.7730  Text \"Support\" to 77219    National [place] on Mental Illness ([place]ERT[place])  " 359.544.1049 or 1.888.JUAN ALBERTO.HELPS      Mental Health Apps  My3  https://Apsepp.org/    VirtualHopeBox  https://TeamSupport/apps/virtual-hope-box/      Additional Information  Today you were seen by a licensed mental health professional through Triage and Transition services, Behavioral Healthcare Providers (P)  for a crisis assessment in the Emergency Department at [facility].  It is recommended that you follow up with your established providers (psychiatrist, mental health therapist, and/or primary care doctor - as relevant) as soon as possible. Coordinators from [facility] will be calling you in the next 24-48 hours to ensure that you have the resources you need.  You can also contact [facility] coordinators directly at [phone]. You may have been scheduled for or offered an appointment with a mental health provider. [facility] maintains an extensive network of licensed behavioral health providers to connect patients with the services they need.  We do not charge providers a fee to participate in our referral network.  We match patients with providers based on a patient's specific needs, insurance coverage, and location.  Our first effort will be to refer you to a provider within your care system, and will utilize providers outside your care system as needed.

## 2022-10-21 NOTE — ED NOTES
"[facility] Crisis Reassessment      Josephine Brown was first seen on 10/19/2022 by Tuyet Spring; see the initial assessment note for details.       Patient Presentation    Initial ED presentation details: She presents to the ED with significant depression and suicidal ideation with plan, means and intent. Patient has several stressors, abruptly quit her job today and has begun making preparations for her family following her death (including cancelling memberships/bills, writing a letter). Patient does acknowledge that she wants help for her symptoms and this is why she voluntarily presented to the hospital; writer discusses plan for her to remain on [facility] at this time for medication management with psychiatry and ongoing therapeutic check-ins with [facility].     Current patient presentation: Patient presented with a full range affect and calm mood. On a scale of 0-10 (0: no SI), she rated the severity of SI as \"1 or 2.\" She endorses some passive SI without plan or intent. Pt reported feeling ready for discharge. She has a therapy appointment this afternoon that she would like to attend. Pt participate in aftercare safety planning. Pt is future thinking. She has plans for this weekend with her parents and plans to hunt deer in November. Pt reported that while at [facility], she enjoyed taking time for herself. She stated that this was a \"mental health reset\" for her.      Changes observed since initial assessment: Patient's depressive sx, including SI, has significantly subsided. On a scale of 0-10, pt self reported the severity of her SI as 1 or 2, yesterday it was 3-4, and on the day of admission to [facility], it was at a 9. Pt's affect is brighter and she is able to practice emotion regulation. Pt identified strengths and expressed confidence in herself.        Risk of Harm    Repeat ESS-6: Date of Completion 10/21  1.a. Over the past 2 weeks, have you had thoughts of killing yourself? Yes  1.b. Have you ever attempted to kill " yourself and, if yes, when did this last happen? Yes 1.5 years ago   2. Recent or current suicide plan? No   3. Recent or current intent to act on ideation? No  4. Lifetime psychiatric hospitalization? Yes  5. Pattern of excessive substance use? No  6. Current irritability, agitation, or aggression? No  Scoring note: BOTH 1a and 1b must be yes for it to score 1 point, if both are not yes it is zero. All others are 1 point per number. If all questions 1a/1b - 6 are no, risk is negligible. If one of 1a/1b is yes, then risk is mild. If either question 2 or 3, but not both, is yes, then risk is automatically moderate regardless of total score. If both 2 and 3 are yes, risk is automatically high regardless of total score.      Score: 2, mild risk    Is the patient experiencing current suicidal ideation: Yes. Passive wish to be dead without thoughts or plan.     Does the patient have thoughts of harming others? No      Does the patient have access to lethal means? Yes: Pt reports having a rifle and shot gun at home to hunt deer.      Does the patient engage in non-suicidal self-injurious behavior (NSSI/SIB)? no     Does the patient have thoughts of harming others? No    Mental Status Exam   Affect: Appropriate   Appearance: Appropriate    Attention Span/Concentration: Attentive?    Eye Contact: Engaged   Fund of Knowledge: Appropriate    Language /Speech Content: Fluent   Language /Speech Volume: Normal    Language /Speech Rate/Productions: Normal    Recent Memory: Intact   Remote Memory: Intact   Mood: Normal    Orientation to Person: Yes    Orientation to Place: Yes   Orientation to Time of Day: Yes    Orientation to Date: Yes    Situation (Do they understand why they are here?): Yes    Psychomotor Behavior: Normal    Thought Content: Clear   Thought Form: Intact       Additional Collateral Information   Writer spoke with patient's mother, Ronda Brown, over the phone ([phone]) to discuss aftercare safety plan.  "Ronda stated that she and her  will be staying with Josephine at her home after discharge, for the weekend (\"or however long she needs us\"). She states that she and her  had discussed what to do with Josephine's firearms at home. Writer notified them of the plan that pt had, which is to leave her ammo with her father or leave the firearms and ammo at their family's cabin. Ronda stated that she would discuss this with her  and with Josephine present.       Therapeutic Intervention  The following therapeutic methodologies were employed when working with the patient: Establishing rapport, Active listening, Assess dimensions of crisis, Apply solution-focused therapy to address current crisis, Identify additional supports and alternative coping skills, Establish a discharge plan, Brief Supportive Therapy, Trauma-Informed Care and Safety planning. Patient response to intervention: positive and receptive.      Clinical Substantiation of Recommendations    Current patient presentation: Patient presented with a full range affect and calm mood. On a scale of 0-10 (0: no SI), she rated the severity of SI as \"1 or 2.\" She endorses some passive SI without plan or intent. Pt reported feeling ready for discharge. She has a therapy appointment this afternoon that she would like to attend. Pt participate in aftercare safety planning. Pt is future thinking. She has plans for this weekend with her parents (they will be staying with her this weekend +) and plans to hunt deer in November. Pt reported that while at [facility], she enjoyed taking time for herself. She stated that this was a \"mental health reset\" for her. Pt's affect is brighter and she is able to practice emotion regulation.            Plan:    Disposition  Recommended disposition: Individual Therapy and Medication Management        Reviewed case and recommendations with attending provider. Attending Name: Vince Benz CNP    Attending concurs with disposition: " Yes      Patient concurs with disposition: Yes      Final disposition: Individual therapy  and Medication management.         Assessment Details  Total duration spent on the patient case in minutes: .50 hrs     CPT code(s) utilized: 40520 - Psychotherapy (with patient) - 30 (16-37*) min       FAUSTINA Brooks, [facility]  Callback: [phone]            Aftercare Plan      Follow up with established providers and supports as scheduled. Continue taking medications as prescribed. Abstain from drugs and alcohol. Utilize your [facility] crisis team as needed. They are available 24/7. Contact information is listed below.       If I am feeling unsafe or I am in a crisis, I will:   Contact my established care providers   Call the [place] Suicide Prevention Lifeline: [phone]   Go to the nearest emergency room   Call 911     Warning signs that I or other people might notice when a crisis is developing for me: changes to sleep, appetite or mood, increased anger, agitation or irritability, feeling depressed or hopeless, spending more time alone or talking less, increased crying, decreased productivity, seeing or hearing things that aren't there, thoughts of not wanting to live anymore or of actually killing myself, thoughts of hurting others    Things I am able to do on my own to cope or help me feel better: watching a favorite tv show or movie, listening to music I enjoy, going outside and breathing fresh air, going for a walk or exercising, taking a shower or bath, a cold or hot beverage, a healthy snack, drawing/coloring/painting, journaling, singing or dancing, deep breathing     I can try practicing square breathing when I begin to feel anxious - inhale through the nose for the count of 4 and the first line on the square. Exhale through the mouth for the count of 4 for the second line of the square. Repeat to complete the square. Repeat the square as many times as needed.    I can also use my five senses to  practice mindfulness and grounding. What are five things I can see, four things I can hear, three things I can feel, two things I can smell, and one thing I can taste.     Things that I am able to do with others to cope or help me feel better: sometimes just talking or spending time with someone else, sharing a meal or having coffee, watching a movie or playing a game, going for a walk or exercising    I can also use community resources including mental health hotlines, [facility] crisis teams, or apps.     Things I can use or do for distraction: take a hot bath, exercise, movies/tv, music, reading, games, drawing/coloring/painting or other art, essential oils, cleaning/organizing, puzzles, crossword puzzles, word search, Sudoku       I can also download a meditation or relaxation denisha, like Calm, Headspace, or Insight Timer (all three offer a free version)    Changes I can make to support my mental health and wellness: Attend scheduled mental health therapy and psychiatric appointments. Take my medications as prescribed. Maintain a daily schedule/routine. Abstain from all mood altering substances, including drugs, alcohol, or medications not currently prescribed to me. Implement a self-care routine.      People in my life that I can ask for help: friends or family, trusted teachers/staff/colleagues, trusted members of my community or place of [employer], mental health crisis lines, or 911    Your [facility] has a mental health crisis team you can call 24/7: [facility], [phone]    Other things that are important when I m in crisis: to remember that the feelings I am having right now are temporary, and it won't feel like this forever, and that it is okay and important to ask for help    Crisis Lines  Crisis Text Line  Text 155941  You will be connected with a trained live crisis counselor to provide support.    Por espanol, texto  GEORGI a 493352 o texto a 442-AYUDAME en [facility] Hope Line  1.800.SUICIDE  "[2363316]      Community Resources  Fast Tracker  Linking people to mental health and substance use disorder resources  [employer].org     [place] Mental Health Warm Line  Peer to peer support  Monday thru Saturday, 12 pm to 10 pm  713.543.3543 or 6.423.470.1092  Text \"Support\" to 20644    National [place] on Mental Illness ([place]AN [place]ERT[place])  415.262.7195 or 1.888.JUAN ALBERTO.HELPS      Mental Health Apps  My3  https://"[employer]".org/    VirtualHopeBox  https://[employer]/apps/virtual-hope-box/      Additional Information  Today you were seen by a licensed mental health professional through Triage and Transition services, Behavioral Healthcare Providers (P)  for a crisis assessment in the Emergency Department at [facility].  It is recommended that you follow up with your established providers (psychiatrist, mental health therapist, and/or primary care doctor - as relevant) as soon as possible. Coordinators from [facility] will be calling you in the next 24-48 hours to ensure that you have the resources you need.  You can also contact [facility] coordinators directly at [phone]. You may have been scheduled for or offered an appointment with a mental health provider. [facility] maintains an extensive network of licensed behavioral health providers to connect patients with the services they need.  We do not charge providers a fee to participate in our referral network.  We match patients with providers based on a patient's specific needs, insurance coverage, and location.  Our first effort will be to refer you to a provider within your care system, and will utilize providers outside your care system as needed.          "

## 2023-02-04 ENCOUNTER — HEALTH MAINTENANCE LETTER (OUTPATIENT)
Age: 33
End: 2023-02-04

## 2024-03-02 ENCOUNTER — HEALTH MAINTENANCE LETTER (OUTPATIENT)
Age: 34
End: 2024-03-02

## 2025-03-15 ENCOUNTER — HEALTH MAINTENANCE LETTER (OUTPATIENT)
Age: 35
End: 2025-03-15